# Patient Record
Sex: FEMALE | NOT HISPANIC OR LATINO | URBAN - METROPOLITAN AREA
[De-identification: names, ages, dates, MRNs, and addresses within clinical notes are randomized per-mention and may not be internally consistent; named-entity substitution may affect disease eponyms.]

---

## 2018-05-10 ENCOUNTER — INPATIENT (INPATIENT)
Facility: HOSPITAL | Age: 53
LOS: 3 days | Discharge: HOME | End: 2018-05-14
Attending: INTERNAL MEDICINE | Admitting: INTERNAL MEDICINE
Payer: COMMERCIAL

## 2018-05-10 VITALS
TEMPERATURE: 98 F | SYSTOLIC BLOOD PRESSURE: 167 MMHG | DIASTOLIC BLOOD PRESSURE: 102 MMHG | RESPIRATION RATE: 20 BRPM | OXYGEN SATURATION: 100 % | HEART RATE: 79 BPM

## 2018-05-10 LAB
ALBUMIN SERPL ELPH-MCNC: 4 G/DL — SIGNIFICANT CHANGE UP (ref 3.3–5)
ALP SERPL-CCNC: 72 U/L — SIGNIFICANT CHANGE UP (ref 40–120)
ALT FLD-CCNC: 14 U/L — SIGNIFICANT CHANGE UP (ref 4–33)
APPEARANCE UR: CLEAR — SIGNIFICANT CHANGE UP
APTT BLD: 30.2 SEC — SIGNIFICANT CHANGE UP (ref 27.5–37.4)
AST SERPL-CCNC: 19 U/L — SIGNIFICANT CHANGE UP (ref 4–32)
BASE EXCESS BLDV CALC-SCNC: 4.1 MMOL/L — SIGNIFICANT CHANGE UP
BASOPHILS # BLD AUTO: 0.07 K/UL — SIGNIFICANT CHANGE UP (ref 0–0.2)
BASOPHILS NFR BLD AUTO: 0.7 % — SIGNIFICANT CHANGE UP (ref 0–2)
BILIRUB SERPL-MCNC: 0.2 MG/DL — SIGNIFICANT CHANGE UP (ref 0.2–1.2)
BILIRUB UR-MCNC: NEGATIVE — SIGNIFICANT CHANGE UP
BLOOD GAS VENOUS - CREATININE: 0.52 MG/DL — SIGNIFICANT CHANGE UP (ref 0.5–1.3)
BLOOD UR QL VISUAL: HIGH
BUN SERPL-MCNC: 11 MG/DL — SIGNIFICANT CHANGE UP (ref 7–23)
CALCIUM SERPL-MCNC: 8.1 MG/DL — LOW (ref 8.4–10.5)
CHLORIDE BLDV-SCNC: 103 MMOL/L — SIGNIFICANT CHANGE UP (ref 96–108)
CHLORIDE SERPL-SCNC: 98 MMOL/L — SIGNIFICANT CHANGE UP (ref 98–107)
CO2 SERPL-SCNC: 27 MMOL/L — SIGNIFICANT CHANGE UP (ref 22–31)
COLOR SPEC: SIGNIFICANT CHANGE UP
CREAT SERPL-MCNC: 0.69 MG/DL — SIGNIFICANT CHANGE UP (ref 0.5–1.3)
EOSINOPHIL # BLD AUTO: 0.31 K/UL — SIGNIFICANT CHANGE UP (ref 0–0.5)
EOSINOPHIL NFR BLD AUTO: 3.3 % — SIGNIFICANT CHANGE UP (ref 0–6)
GAS PNL BLDV: 137 MMOL/L — SIGNIFICANT CHANGE UP (ref 136–146)
GLUCOSE BLDV-MCNC: 104 — HIGH (ref 70–99)
GLUCOSE SERPL-MCNC: 105 MG/DL — HIGH (ref 70–99)
GLUCOSE UR-MCNC: NEGATIVE — SIGNIFICANT CHANGE UP
HCO3 BLDV-SCNC: 26 MMOL/L — SIGNIFICANT CHANGE UP (ref 20–27)
HCT VFR BLD CALC: 36.7 % — SIGNIFICANT CHANGE UP (ref 34.5–45)
HCT VFR BLDV CALC: 36.7 % — SIGNIFICANT CHANGE UP (ref 34.5–45)
HGB BLD-MCNC: 11.7 G/DL — SIGNIFICANT CHANGE UP (ref 11.5–15.5)
HGB BLDV-MCNC: 11.9 G/DL — SIGNIFICANT CHANGE UP (ref 11.5–15.5)
IMM GRANULOCYTES # BLD AUTO: 0.03 # — SIGNIFICANT CHANGE UP
IMM GRANULOCYTES NFR BLD AUTO: 0.3 % — SIGNIFICANT CHANGE UP (ref 0–1.5)
INR BLD: 1.01 — SIGNIFICANT CHANGE UP (ref 0.88–1.17)
KETONES UR-MCNC: NEGATIVE — SIGNIFICANT CHANGE UP
LACTATE BLDV-MCNC: 1.1 MMOL/L — SIGNIFICANT CHANGE UP (ref 0.5–2)
LEUKOCYTE ESTERASE UR-ACNC: HIGH
LYMPHOCYTES # BLD AUTO: 1.45 K/UL — SIGNIFICANT CHANGE UP (ref 1–3.3)
LYMPHOCYTES # BLD AUTO: 15.3 % — SIGNIFICANT CHANGE UP (ref 13–44)
MCHC RBC-ENTMCNC: 28.1 PG — SIGNIFICANT CHANGE UP (ref 27–34)
MCHC RBC-ENTMCNC: 31.9 % — LOW (ref 32–36)
MCV RBC AUTO: 88.2 FL — SIGNIFICANT CHANGE UP (ref 80–100)
MONOCYTES # BLD AUTO: 0.94 K/UL — HIGH (ref 0–0.9)
MONOCYTES NFR BLD AUTO: 9.9 % — SIGNIFICANT CHANGE UP (ref 2–14)
MUCOUS THREADS # UR AUTO: SIGNIFICANT CHANGE UP
NEUTROPHILS # BLD AUTO: 6.67 K/UL — SIGNIFICANT CHANGE UP (ref 1.8–7.4)
NEUTROPHILS NFR BLD AUTO: 70.5 % — SIGNIFICANT CHANGE UP (ref 43–77)
NITRITE UR-MCNC: NEGATIVE — SIGNIFICANT CHANGE UP
NON-SQ EPI CELLS # UR AUTO: <1 — SIGNIFICANT CHANGE UP
NRBC # FLD: 0 — SIGNIFICANT CHANGE UP
NT-PROBNP SERPL-SCNC: 338.1 PG/ML — SIGNIFICANT CHANGE UP
PCO2 BLDV: 48 MMHG — SIGNIFICANT CHANGE UP (ref 41–51)
PH BLDV: 7.39 PH — SIGNIFICANT CHANGE UP (ref 7.32–7.43)
PH UR: 7 — SIGNIFICANT CHANGE UP (ref 4.6–8)
PLATELET # BLD AUTO: 379 K/UL — SIGNIFICANT CHANGE UP (ref 150–400)
PMV BLD: 9.7 FL — SIGNIFICANT CHANGE UP (ref 7–13)
PO2 BLDV: 25 MMHG — LOW (ref 35–40)
POTASSIUM BLDV-SCNC: 3.5 MMOL/L — SIGNIFICANT CHANGE UP (ref 3.4–4.5)
POTASSIUM SERPL-MCNC: 3.8 MMOL/L — SIGNIFICANT CHANGE UP (ref 3.5–5.3)
POTASSIUM SERPL-SCNC: 3.8 MMOL/L — SIGNIFICANT CHANGE UP (ref 3.5–5.3)
PROT SERPL-MCNC: 7.3 G/DL — SIGNIFICANT CHANGE UP (ref 6–8.3)
PROT UR-MCNC: NEGATIVE MG/DL — SIGNIFICANT CHANGE UP
PROTHROM AB SERPL-ACNC: 11.2 SEC — SIGNIFICANT CHANGE UP (ref 9.8–13.1)
RBC # BLD: 4.16 M/UL — SIGNIFICANT CHANGE UP (ref 3.8–5.2)
RBC # FLD: 15.1 % — HIGH (ref 10.3–14.5)
RBC CASTS # UR COMP ASSIST: SIGNIFICANT CHANGE UP (ref 0–?)
SAO2 % BLDV: 37.7 % — LOW (ref 60–85)
SODIUM SERPL-SCNC: 138 MMOL/L — SIGNIFICANT CHANGE UP (ref 135–145)
SP GR SPEC: 1.01 — SIGNIFICANT CHANGE UP (ref 1–1.04)
SQUAMOUS # UR AUTO: SIGNIFICANT CHANGE UP
TROPONIN T SERPL-MCNC: < 0.06 NG/ML — SIGNIFICANT CHANGE UP (ref 0–0.06)
UROBILINOGEN FLD QL: NORMAL MG/DL — SIGNIFICANT CHANGE UP
WBC # BLD: 9.47 K/UL — SIGNIFICANT CHANGE UP (ref 3.8–10.5)
WBC # FLD AUTO: 9.47 K/UL — SIGNIFICANT CHANGE UP (ref 3.8–10.5)
WBC UR QL: SIGNIFICANT CHANGE UP (ref 0–?)

## 2018-05-10 PROCEDURE — 74174 CTA ABD&PLVS W/CONTRAST: CPT | Mod: 26

## 2018-05-10 PROCEDURE — 70450 CT HEAD/BRAIN W/O DYE: CPT | Mod: 26

## 2018-05-10 PROCEDURE — 71275 CT ANGIOGRAPHY CHEST: CPT | Mod: 26

## 2018-05-10 PROCEDURE — 71045 X-RAY EXAM CHEST 1 VIEW: CPT | Mod: 26

## 2018-05-10 RX ORDER — ASPIRIN/CALCIUM CARB/MAGNESIUM 324 MG
324 TABLET ORAL ONCE
Qty: 0 | Refills: 0 | Status: COMPLETED | OUTPATIENT
Start: 2018-05-10 | End: 2018-05-10

## 2018-05-10 RX ORDER — FAMOTIDINE 10 MG/ML
20 INJECTION INTRAVENOUS ONCE
Qty: 0 | Refills: 0 | Status: COMPLETED | OUTPATIENT
Start: 2018-05-10 | End: 2018-05-10

## 2018-05-10 RX ADMIN — FAMOTIDINE 20 MILLIGRAM(S): 10 INJECTION INTRAVENOUS at 21:39

## 2018-05-10 RX ADMIN — Medication 324 MILLIGRAM(S): at 21:39

## 2018-05-10 NOTE — ED ADULT TRIAGE NOTE - CHIEF COMPLAINT QUOTE
Pt with multiple complaints co intermittent  chest pain radiating into upper back x 1month with swelling to lower legs which has now subsided . Pt was in [place] when her symptoms started. pt now reports still having chest pain. Pt also reports droop to right eye and mouth, pts mouth asymmetrical appears to look like bells  palsy   . pt also reports increased thirst x 1 day with frequent urination finger stick in triage 109.

## 2018-05-10 NOTE — ED ADULT NURSE NOTE - OBJECTIVE STATEMENT
pt a*o x3, Telugu speaking, son at bedside as preferred  c/o chest pain and right facial droop. pt states she has been having facial droop x 1 week and chest pain on and off x 1 month. states pain is worst at night and laying down, radiating towards the back. currently denies chest pain, numbness, tingling and headache. md assessed. right ac 20g placed. nad noted. will monitor

## 2018-05-10 NOTE — ED PROVIDER NOTE - OBJECTIVE STATEMENT
51 yo F w/ unclear pmhx, brought in by family members for intermittent chest and back pain x 1 month, and right side facial droop x 1 week. Chest pain is intermittent, worst at night, when lying flat, burning. Denies any nausea, vomiting, fever, chills, productive cough, runny nose, sore throat. Back pain is now radiating to the left arm. Patient has an asymmetric smile, unable to close right eyes. denies any numbness or tingling to the upper and lower extremities. Denies any hx of stroke. Pt just got back from a 2 month vacation in [facility], where she was seen by a cardiologist for chest pain. Pt does not have a cardiologist in the [place]. 53 yo F w/ unclear pmhx, brought in by family members for intermittent chest and back pain x 1 month, and right side facial droop x 1 week. Chest pain is intermittent, worst at night, when lying flat, burning. Denies any nausea, vomiting, fever, chills, productive cough, runny nose, sore throat. Back pain is now radiating to the left arm. Patient has an asymmetric smile, unable to close right eyes. denies any numbness or tingling to the upper and lower extremities. Denies any hx of stroke. Pt just got back from a 2 month vacation in [facility], where she was seen by a cardiologist for chest pain. Pt does not have a cardiologist in the [facility].

## 2018-05-10 NOTE — ED PROVIDER NOTE - ATTENDING CONTRIBUTION TO CARE
MD Marques:  I performed a face to face bedside interview with patient regarding history of present illness, review of symptoms and past medical history. I completed an independent physical exam(documented below).  I have discussed patient's plan of care with resident.   I agree with note as stated above, having amended the EMR as needed to reflect my findings. I have discussed the assessment and plan of care.  This includes during the time I functioned as the attending physician for this patient.  PE:  Gen: Alert, in mild distress  Head: NC, AT,  EOMI, normal lids/conjunctiva  ENT:  normal hearing, patent oropharynx without erythema/exudate, uvula midline  Neck: +supple, no tenderness/meningismus/JVD, +Trachea midline  Chest: no chest wall tenderness, equal chest rise  Pulm: Bilateral BS, normal resp effort, no wheeze/stridor/retractions  CV: RRR, no M/R/G, +dist pulses  Abd: soft, NT/ND, +BS, no hepatosplenomegaly  Rectal: deferred  Mskel: no edema/erythema/cyanosis  Skin: no rash  Neuro: AAOx3, +right sided facial droop; 5/5 symmetrical strength and sensation in all extremities  MDM:  53yo F, denies pmh, brought in by family for intermittent cp and back pain (unclear if rads from chest to back) X 1 month, worsened at night and when recumbent, began after recent flight to [employer]. Also reporting R facial droop X 1week. Facial droop most consistent w/ bell's palsy, but will get CT head. Labs and CTA chest/AP to r/o dissection/PE, and admission for ACS r/o.

## 2018-05-11 DIAGNOSIS — E78.5 HYPERLIPIDEMIA, UNSPECIFIED: ICD-10-CM

## 2018-05-11 DIAGNOSIS — F41.9 ANXIETY DISORDER, UNSPECIFIED: ICD-10-CM

## 2018-05-11 DIAGNOSIS — Z29.9 ENCOUNTER FOR PROPHYLACTIC MEASURES, UNSPECIFIED: ICD-10-CM

## 2018-05-11 DIAGNOSIS — M54.9 DORSALGIA, UNSPECIFIED: ICD-10-CM

## 2018-05-11 DIAGNOSIS — R07.9 CHEST PAIN, UNSPECIFIED: ICD-10-CM

## 2018-05-11 DIAGNOSIS — I25.10 ATHEROSCLEROTIC HEART DISEASE OF NATIVE CORONARY ARTERY WITHOUT ANGINA PECTORIS: ICD-10-CM

## 2018-05-11 DIAGNOSIS — E03.9 HYPOTHYROIDISM, UNSPECIFIED: ICD-10-CM

## 2018-05-11 DIAGNOSIS — G51.0 BELL'S PALSY: ICD-10-CM

## 2018-05-11 DIAGNOSIS — I10 ESSENTIAL (PRIMARY) HYPERTENSION: ICD-10-CM

## 2018-05-11 LAB
BUN SERPL-MCNC: 8 MG/DL — SIGNIFICANT CHANGE UP (ref 7–23)
CALCIUM SERPL-MCNC: 8.3 MG/DL — LOW (ref 8.4–10.5)
CHLORIDE SERPL-SCNC: 102 MMOL/L — SIGNIFICANT CHANGE UP (ref 98–107)
CHOLEST SERPL-MCNC: 127 MG/DL — SIGNIFICANT CHANGE UP (ref 120–199)
CK MB BLD-MCNC: 1 NG/ML — SIGNIFICANT CHANGE UP (ref 1–4.7)
CK MB BLD-MCNC: 1.22 NG/ML — SIGNIFICANT CHANGE UP (ref 1–4.7)
CK MB BLD-MCNC: SIGNIFICANT CHANGE UP (ref 0–2.5)
CK SERPL-CCNC: 45 U/L — SIGNIFICANT CHANGE UP (ref 25–170)
CK SERPL-CCNC: 57 U/L — SIGNIFICANT CHANGE UP (ref 25–170)
CO2 SERPL-SCNC: 26 MMOL/L — SIGNIFICANT CHANGE UP (ref 22–31)
CREAT SERPL-MCNC: 0.66 MG/DL — SIGNIFICANT CHANGE UP (ref 0.5–1.3)
GLUCOSE SERPL-MCNC: 94 MG/DL — SIGNIFICANT CHANGE UP (ref 70–99)
HBA1C BLD-MCNC: 6.3 % — HIGH (ref 4–5.6)
HCT VFR BLD CALC: 37.5 % — SIGNIFICANT CHANGE UP (ref 34.5–45)
HDLC SERPL-MCNC: 35 MG/DL — LOW (ref 45–65)
HGB BLD-MCNC: 12.1 G/DL — SIGNIFICANT CHANGE UP (ref 11.5–15.5)
LIPID PNL WITH DIRECT LDL SERPL: 83 MG/DL — SIGNIFICANT CHANGE UP
MAGNESIUM SERPL-MCNC: 2.2 MG/DL — SIGNIFICANT CHANGE UP (ref 1.6–2.6)
MCHC RBC-ENTMCNC: 27.9 PG — SIGNIFICANT CHANGE UP (ref 27–34)
MCHC RBC-ENTMCNC: 32.3 % — SIGNIFICANT CHANGE UP (ref 32–36)
MCV RBC AUTO: 86.4 FL — SIGNIFICANT CHANGE UP (ref 80–100)
NRBC # FLD: 0 — SIGNIFICANT CHANGE UP
PHOSPHATE SERPL-MCNC: 4.3 MG/DL — SIGNIFICANT CHANGE UP (ref 2.5–4.5)
PLATELET # BLD AUTO: 371 K/UL — SIGNIFICANT CHANGE UP (ref 150–400)
PMV BLD: 9.8 FL — SIGNIFICANT CHANGE UP (ref 7–13)
POTASSIUM SERPL-MCNC: 3.9 MMOL/L — SIGNIFICANT CHANGE UP (ref 3.5–5.3)
POTASSIUM SERPL-SCNC: 3.9 MMOL/L — SIGNIFICANT CHANGE UP (ref 3.5–5.3)
RBC # BLD: 4.34 M/UL — SIGNIFICANT CHANGE UP (ref 3.8–5.2)
RBC # FLD: 15.2 % — HIGH (ref 10.3–14.5)
SODIUM SERPL-SCNC: 140 MMOL/L — SIGNIFICANT CHANGE UP (ref 135–145)
TRIGL SERPL-MCNC: 107 MG/DL — SIGNIFICANT CHANGE UP (ref 10–149)
TROPONIN T SERPL-MCNC: < 0.06 NG/ML — SIGNIFICANT CHANGE UP (ref 0–0.06)
TSH SERPL-MCNC: 6.63 UIU/ML — HIGH (ref 0.27–4.2)
WBC # BLD: 8.66 K/UL — SIGNIFICANT CHANGE UP (ref 3.8–10.5)
WBC # FLD AUTO: 8.66 K/UL — SIGNIFICANT CHANGE UP (ref 3.8–10.5)

## 2018-05-11 PROCEDURE — 93010 ELECTROCARDIOGRAM REPORT: CPT

## 2018-05-11 PROCEDURE — 70551 MRI BRAIN STEM W/O DYE: CPT | Mod: 26

## 2018-05-11 RX ORDER — ENOXAPARIN SODIUM 100 MG/ML
40 INJECTION SUBCUTANEOUS EVERY 24 HOURS
Qty: 0 | Refills: 0 | Status: DISCONTINUED | OUTPATIENT
Start: 2018-05-11 | End: 2018-05-14

## 2018-05-11 RX ORDER — ASPIRIN/CALCIUM CARB/MAGNESIUM 324 MG
81 TABLET ORAL DAILY
Qty: 0 | Refills: 0 | Status: DISCONTINUED | OUTPATIENT
Start: 2018-05-11 | End: 2018-05-14

## 2018-05-11 RX ORDER — LEVOTHYROXINE SODIUM 125 MCG
50 TABLET ORAL DAILY
Qty: 0 | Refills: 0 | Status: DISCONTINUED | OUTPATIENT
Start: 2018-05-11 | End: 2018-05-14

## 2018-05-11 RX ORDER — CLONAZEPAM 1 MG
0.5 TABLET ORAL THREE TIMES A DAY
Qty: 0 | Refills: 0 | Status: DISCONTINUED | OUTPATIENT
Start: 2018-05-11 | End: 2018-05-14

## 2018-05-11 RX ORDER — LEVOTHYROXINE SODIUM 125 MCG
1 TABLET ORAL
Qty: 0 | Refills: 0 | COMMUNITY

## 2018-05-11 RX ORDER — CLONAZEPAM 1 MG
1 TABLET ORAL
Qty: 0 | Refills: 0 | COMMUNITY

## 2018-05-11 RX ORDER — ACETAMINOPHEN 500 MG
650 TABLET ORAL EVERY 6 HOURS
Qty: 0 | Refills: 0 | Status: DISCONTINUED | OUTPATIENT
Start: 2018-05-11 | End: 2018-05-14

## 2018-05-11 RX ORDER — ROSUVASTATIN CALCIUM 5 MG/1
1 TABLET ORAL
Qty: 0 | Refills: 0 | COMMUNITY

## 2018-05-11 RX ORDER — PANTOPRAZOLE SODIUM 20 MG/1
40 TABLET, DELAYED RELEASE ORAL
Qty: 0 | Refills: 0 | Status: DISCONTINUED | OUTPATIENT
Start: 2018-05-11 | End: 2018-05-14

## 2018-05-11 RX ORDER — ACETAMINOPHEN 500 MG
2 TABLET ORAL
Qty: 0 | Refills: 0 | COMMUNITY

## 2018-05-11 RX ORDER — ONDANSETRON 8 MG/1
4 TABLET, FILM COATED ORAL ONCE
Qty: 0 | Refills: 0 | Status: COMPLETED | OUTPATIENT
Start: 2018-05-11 | End: 2018-05-11

## 2018-05-11 RX ORDER — CLOPIDOGREL BISULFATE 75 MG/1
1 TABLET, FILM COATED ORAL
Qty: 0 | Refills: 0 | COMMUNITY

## 2018-05-11 RX ORDER — METOPROLOL TARTRATE 50 MG
1 TABLET ORAL
Qty: 0 | Refills: 0 | COMMUNITY

## 2018-05-11 RX ORDER — METOPROLOL TARTRATE 50 MG
50 TABLET ORAL
Qty: 0 | Refills: 0 | Status: DISCONTINUED | OUTPATIENT
Start: 2018-05-11 | End: 2018-05-14

## 2018-05-11 RX ORDER — OXYCODONE HYDROCHLORIDE 5 MG/1
20 TABLET ORAL EVERY 12 HOURS
Qty: 0 | Refills: 0 | Status: DISCONTINUED | OUTPATIENT
Start: 2018-05-11 | End: 2018-05-14

## 2018-05-11 RX ORDER — ATORVASTATIN CALCIUM 80 MG/1
40 TABLET, FILM COATED ORAL AT BEDTIME
Qty: 0 | Refills: 0 | Status: DISCONTINUED | OUTPATIENT
Start: 2018-05-11 | End: 2018-05-14

## 2018-05-11 RX ORDER — ASPIRIN/CALCIUM CARB/MAGNESIUM 324 MG
1 TABLET ORAL
Qty: 0 | Refills: 0 | COMMUNITY

## 2018-05-11 RX ORDER — VALACYCLOVIR 500 MG/1
1000 TABLET, FILM COATED ORAL THREE TIMES A DAY
Qty: 0 | Refills: 0 | Status: DISCONTINUED | OUTPATIENT
Start: 2018-05-11 | End: 2018-05-14

## 2018-05-11 RX ORDER — OXYCODONE HYDROCHLORIDE 5 MG/1
1 TABLET ORAL
Qty: 0 | Refills: 0 | COMMUNITY

## 2018-05-11 RX ORDER — CLOPIDOGREL BISULFATE 75 MG/1
75 TABLET, FILM COATED ORAL DAILY
Qty: 0 | Refills: 0 | Status: DISCONTINUED | OUTPATIENT
Start: 2018-05-11 | End: 2018-05-14

## 2018-05-11 RX ADMIN — Medication 1 DROP(S): at 23:53

## 2018-05-11 RX ADMIN — Medication 650 MILLIGRAM(S): at 09:04

## 2018-05-11 RX ADMIN — Medication 81 MILLIGRAM(S): at 14:46

## 2018-05-11 RX ADMIN — Medication 650 MILLIGRAM(S): at 09:35

## 2018-05-11 RX ADMIN — ONDANSETRON 4 MILLIGRAM(S): 8 TABLET, FILM COATED ORAL at 14:46

## 2018-05-11 RX ADMIN — OXYCODONE HYDROCHLORIDE 20 MILLIGRAM(S): 5 TABLET ORAL at 11:05

## 2018-05-11 RX ADMIN — CLOPIDOGREL BISULFATE 75 MILLIGRAM(S): 75 TABLET, FILM COATED ORAL at 14:46

## 2018-05-11 RX ADMIN — Medication 0.5 MILLIGRAM(S): at 15:45

## 2018-05-11 RX ADMIN — Medication 50 MICROGRAM(S): at 09:04

## 2018-05-11 RX ADMIN — ATORVASTATIN CALCIUM 40 MILLIGRAM(S): 80 TABLET, FILM COATED ORAL at 21:10

## 2018-05-11 RX ADMIN — ENOXAPARIN SODIUM 40 MILLIGRAM(S): 100 INJECTION SUBCUTANEOUS at 09:03

## 2018-05-11 RX ADMIN — ONDANSETRON 4 MILLIGRAM(S): 8 TABLET, FILM COATED ORAL at 20:15

## 2018-05-11 RX ADMIN — VALACYCLOVIR 1000 MILLIGRAM(S): 500 TABLET, FILM COATED ORAL at 23:53

## 2018-05-11 RX ADMIN — Medication 30 MILLILITER(S): at 21:10

## 2018-05-11 RX ADMIN — Medication 0.5 MILLIGRAM(S): at 21:10

## 2018-05-11 RX ADMIN — Medication 50 MILLIGRAM(S): at 18:09

## 2018-05-11 RX ADMIN — OXYCODONE HYDROCHLORIDE 20 MILLIGRAM(S): 5 TABLET ORAL at 10:24

## 2018-05-11 RX ADMIN — Medication 50 MILLIGRAM(S): at 09:05

## 2018-05-11 NOTE — H&P ADULT - ASSESSMENT
51 y/o female with a PMHx of presumed CAD (started on medical management in [facility]), HTN, HLD, hypothyroidism, chronic back pain and anxiety presents to ED with chest pain, back pain and right facial droop.

## 2018-05-11 NOTE — PROVIDER CONTACT NOTE (OTHER) - BACKGROUND
Pt admitted with chest pain, Aspirin was administered in the ER Pt admitted with chest pain, Aspirin was administered in the ER.

## 2018-05-11 NOTE — H&P ADULT - PROBLEM SELECTOR PLAN 2
Facial droop and inability to close right eye with no other neurological deficits likely consistent with Bell's Palsy  CT head negative  Consider neurology consult or MRI head to rule out CVA  Consider steroid or antiviral therapy

## 2018-05-11 NOTE — H&P ADULT - NEGATIVE GASTROINTESTINAL SYMPTOMS
no nausea/no change in bowel habits/no flatulence/no abdominal pain/no melena/no constipation/no hematochezia/no vomiting/no diarrhea

## 2018-05-11 NOTE — H&P ADULT - PROBLEM SELECTOR PLAN 3
Chronic back pain has been treated with Oxy and Tylenol  Continue Oxycodone and Tylenol  Will consider pain management consult if no relief of pain

## 2018-05-11 NOTE — H&P ADULT - PMH
Anxiety    CAD (coronary artery disease)  Medically managed  Chronic back pain    HLD (hyperlipidemia)    HTN (hypertension)    Hypothyroidism

## 2018-05-11 NOTE — CONSULT NOTE ADULT - CONSULT REASON
cp, back pain, right facial droop  PMHx of presumed CAD (started on medical management in [facility]), HTN, HLD, hypothyroidism, chronic back pain and anxiet

## 2018-05-11 NOTE — CONSULT NOTE ADULT - ATTENDING COMMENTS
Patient seen and examined with neurology resident and above note reviewed and I agree with assessment and plan as outlined. Patient with right peripheral facial palsy in setting of recent nonspecific viral infection. She otherwise has no other new deficits and MRI brain reviewed and shows only some nonspecific white matter changes but no acute ischemia.   COntinue antiviral, prednisone and eye lubrications, eye patch and artifical tears and supportive care and all questions answered with niece at bedside. .
Agree with above NP note.  cv stable  pt with facial droop and chronic chest pain  ecg with t wave abnl  plan for stress and echo for sx  neuro f/u for poss bells palsy

## 2018-05-11 NOTE — CONSULT NOTE ADULT - SUBJECTIVE AND OBJECTIVE BOX
CARDIOLOGY CONSULT - Dr. Rankin     CHIEF COMPLAINT:    HPI:  51 y/o Sinhala female, who arrived from [facility] two days ago, Pmed hx as mentioned below  presents to ED with chest pain, back pain and right facial droop. Pt reports that she has been having intermittent, non-pleuritic, non-exertional, left sided chest pain radiating to the left arm for the past month. Pt started to experience these symptoms last month in [facility] and was treated medically with ASA, Plavix, statin and nitroglycerin. Pt had an echocardiogram which revealed normal LV function, based on paperwork brought here. She did not have an angiogram or nuclear stress test. Since then, pt has been having chest pain intermittently with no palliating or provoking factors. Pt also has been experiencing a right facial droop, which started one week ago, which is associated with an inability to close her right eye. Pt has no other neurological deficits. Pt also admits to chronic back pain, which is causing a substantial amount of pain despite taking Oxycodone and Tylenol at home. Pt was recently treated for typhoid fever in [facility]. Pt denies fever, chills, headache, dizziness, visual deficits, shortness of breath, orthopnea, palpitations, abdominal pain, N/V/D/C, hematochezia, melena, dysuria, hematuria, LOC, syncope, peripheral edema, unilateral weakness, paresthesias, urinary or fecal incontinence.      PAST MEDICAL & SURGICAL HISTORY:  Chronic back pain  Hypothyroidism  Anxiety  CAD (coronary artery disease): Medically managed  HLD (hyperlipidemia)  HTN (hypertension)  No significant past surgical history          PREVIOUS DIAGNOSTIC TESTING:    [ ] Echocardiogram:    [ ]  Catheterization:    [ ] Stress Test:  	    MEDICATIONS:  MEDICATIONS  (STANDING):  aspirin enteric coated 81 milliGRAM(s) Oral daily  atorvastatin 40 milliGRAM(s) Oral at bedtime  clonazePAM Tablet 0.5 milliGRAM(s) Oral three times a day  clopidogrel Tablet 75 milliGRAM(s) Oral daily  enoxaparin Injectable 40 milliGRAM(s) SubCutaneous every 24 hours  levothyroxine 50 MICROGram(s) Oral daily  metoprolol tartrate 50 milliGRAM(s) Oral two times a day      FAMILY HISTORY:  No pertinent family history in first degree relatives      SOCIAL HISTORY:    [ ] Non-smoker  [ ] Smoker  [ ] Alcohol    Allergies    No Known Allergies    Intolerances    	    REVIEW OF SYSTEMS:  CONSTITUTIONAL: No fever, weight loss, or fatigue  EYES: No eye pain, visual disturbances, or discharge  ENMT:  No difficulty hearing, tinnitus, vertigo; No sinus or throat pain  NECK: No pain or stiffness  RESPIRATORY: No cough, wheezing, chills or hemoptysis; No Shortness of Breath  CARDIOVASCULAR: No chest pain, palpitations, passing out, dizziness, or leg swelling  GASTROINTESTINAL: No abdominal or epigastric pain. No nausea, vomiting, or hematemesis; No diarrhea or constipation. No melena or hematochezia.  GENITOURINARY: No dysuria, frequency, hematuria, or incontinence  NEUROLOGICAL: No headaches, memory loss, loss of strength, numbness, or tremors  SKIN: No itching, burning, rashes, or lesions   	    [ ] All others negative	  [ ] Unable to obtain    PHYSICAL EXAM:  T(C): 36.6 (05-11-18 @ 09:02), Max: 36.8 (05-11-18 @ 00:30)  HR: 77 (05-11-18 @ 09:02) (73 - 87)  BP: 149/91 (05-11-18 @ 09:02) (149/91 - 167/102)  RR: 17 (05-11-18 @ 09:02) (16 - 24)  SpO2: 99% (05-11-18 @ 09:02) (97% - 100%)  Wt(kg): --  I&O's Summary      Appearance: Normal	  Psychiatry: A & O x 3, Mood & affect appropriate  HEENT:   Normal oral mucosa, PERRL, EOMI	  Lymphatic: No lymphadenopathy  Cardiovascular: Normal S1 S2,RRR, No JVD, No murmurs  Respiratory: Lungs clear to auscultation	  Gastrointestinal:  Soft, Non-tender, + BS	  Skin: No rashes, No ecchymoses, No cyanosis	  Neurologic: Non-focal  Extremities: Normal range of motion, No clubbing, cyanosis or edema  Vascular: Peripheral pulses palpable 2+ bilaterally    TELEMETRY: 	    ECG:  	  RADIOLOGY:  < from: CT Head No Cont (05.10.18 @ 23:48) >    IMPRESSION:     No CT evidence of acute intracranial hemorrhage or mass effect.  No CT evidence of acute demarcated territorial infarct.           ------------------------------------------------------------------------------------------------      < from: Xray Chest 1 View- PORTABLE-Urgent (05.10.18 @ 21:20) >  FINDINGS:     The cardia mediastinal silhouette is not well evaluated in this   projection however it appears enlarged. There are no focal pulmonary   consolidations. There is no pneumothorax. There is no significant pleural   effusion.  Degenerative changes of the thoracic spine are present.    IMPRESSION:   Cardiomegaly with clear lungs.            < end of copied text >    OTHER: 	  < from: CT Angio Chest w/ IV Cont (05.10.18 @ 23:49) >  IMPRESSION:     No aortic dissection.    Extensive mediastinal and bilateral hilar lymphadenopathy.  Subcentimeter peripancreatic lymph node.              LABS:	 	    CARDIAC MARKERS:      CKMB: 1.00 ng/mL (05-11 @ 04:20)  CKMB: 1.22 ng/mL (05-10 @ 20:45)    CKMB Relative Index: Test not performed (05-10 @ 20:45)                            12.1   8.66  )-----------( 371      ( 11 May 2018 04:20 )             37.5     05-11    140  |  102  |  8   ----------------------------<  94  3.9   |  26  |  0.66    Ca    8.3<L>      11 May 2018 04:20  Phos  4.3     05-11  Mg     2.2     05-11    TPro  7.3  /  Alb  4.0  /  TBili  0.2  /  DBili  x   /  AST  19  /  ALT  14  /  AlkPhos  72  05-10    PT/INR - ( 10 May 2018 20:45 )   PT: 11.2 SEC;   INR: 1.01          PTT - ( 10 May 2018 20:45 )  PTT:30.2 SEC  proBNP: Serum Pro-Brain Natriuretic Peptide: 338.1 pg/mL (05-10 @ 20:45)    Lipid Profile:   HgA1c: Hemoglobin A1C, Whole Blood: 6.3 % (05-11 @ 04:20)    TSH: Thyroid Stimulating Hormone, Serum: 6.63 uIU/mL (05-11 @ 04:20) CARDIOLOGY CONSULT - Dr. Rankin     CHIEF COMPLAINT: cp / back pain/ facial droop     HPI:  53 y/o [facility] female, with  Pmed hx as mentioned below  presents to ED with chest pain, back pain and right facial droop. Pt reports that she has been having intermittent, non-pleuritic, non-exertional, left sided chest pain radiating to the left arm for the past month.  Patient and family reports symptoms of cp started after she had typhoid fever.   In [facility] she was treated medically with ASA, Plavix, statin and nitroglycerin. Pt had an echocardiogram which revealed normal LV function,, LVH, grade I diastolic dysfx, mild pericardial effusion. . She did not have an angiogram or nuclear stress test. Since then, pt has been having chest pain intermittently with no palliating or provoking factors. Pt also has been experiencing a right facial droop, which started one week ago, which is associated with an inability to close her right eye. Pt has no other neurological deficits. Pt also admits to chronic back pain, which is causing a substantial amount of pain despite taking Oxycodone and Tylenol at home. Pt denies fever, chills, headache, dizziness, visual deficits, shortness of breath, orthopnea, palpitations, abdominal pain, N/V/D/C, hematochezia, melena, dysuria, hematuria, LOC, syncope, peripheral edema, unilateral weakness, paresthesias, urinary or fecal incontinence.      PAST MEDICAL & SURGICAL HISTORY:  Chronic back pain  Hypothyroidism  Anxiety  CAD (coronary artery disease): Medically managed  HLD (hyperlipidemia)  HTN (hypertension)  No significant past surgical history          PREVIOUS DIAGNOSTIC TESTING:    [ ] Echocardiogram:    [ ]  Catheterization:    [ ] Stress Test:  	    MEDICATIONS:  MEDICATIONS  (STANDING):  aspirin enteric coated 81 milliGRAM(s) Oral daily  atorvastatin 40 milliGRAM(s) Oral at bedtime  clonazePAM Tablet 0.5 milliGRAM(s) Oral three times a day  clopidogrel Tablet 75 milliGRAM(s) Oral daily  enoxaparin Injectable 40 milliGRAM(s) SubCutaneous every 24 hours  levothyroxine 50 MICROGram(s) Oral daily  metoprolol tartrate 50 milliGRAM(s) Oral two times a day      FAMILY HISTORY:  No pertinent family history in first degree relatives      SOCIAL HISTORY:    [x ] Non-smoker  [ ] Smoker  [ ] Alcohol    Allergies    No Known Allergies    Intolerances    	    REVIEW OF SYSTEMS:  CONSTITUTIONAL: No fever, weight loss, or fatigue  EYES: No eye pain, visual disturbances, or discharge  ENMT:  No difficulty hearing, tinnitus, vertigo; No sinus or throat pain  NECK: No pain or stiffness  RESPIRATORY: No cough, wheezing, chills or hemoptysis; No Shortness of Breath  CARDIOVASCULAR: No palpitations, passing out, dizziness, or leg swelling + CP   GASTROINTESTINAL: No abdominal or epigastric pain. No nausea, vomiting, or hematemesis; No diarrhea or constipation. No melena or hematochezia.  GENITOURINARY: No dysuria, frequency, hematuria, or incontinence  NEUROLOGICAL: No headaches, memory loss, loss of strength, numbness, or tremors  SKIN: No itching, burning, rashes, or lesions   	    [x ] All others negative	  [ ] Unable to obtain    PHYSICAL EXAM:  T(C): 36.6 (05-11-18 @ 09:02), Max: 36.8 (05-11-18 @ 00:30)  HR: 77 (05-11-18 @ 09:02) (73 - 87)  BP: 149/91 (05-11-18 @ 09:02) (149/91 - 167/102)  RR: 17 (05-11-18 @ 09:02) (16 - 24)  SpO2: 99% (05-11-18 @ 09:02) (97% - 100%)  Wt(kg): --  I&O's Summary      Appearance: Normal	  Psychiatry: A & O x 3, Mood & affect appropriate  HEENT:   Normal oral mucosa, PERRL, EOMI	  Lymphatic: No lymphadenopathy  Cardiovascular: Normal S1 S2,RRR, No JVD, No murmurs  Respiratory: Lungs clear to auscultation	  Gastrointestinal:  Soft, Non-tender, + BS	  Skin: No rashes, No ecchymoses, No cyanosis	  Neurologic: Non-focal  Extremities: Normal range of motion, No clubbing, cyanosis or edema  Vascular: Peripheral pulses palpable 2+ bilaterally    TELEMETRY: NSR with TWI	    ECG:  NSR HR 61 with TWI on septal leads 	  RADIOLOGY:  < from: CT Head No Cont (05.10.18 @ 23:48) >    IMPRESSION:     No CT evidence of acute intracranial hemorrhage or mass effect.  No CT evidence of acute demarcated territorial infarct.           ------------------------------------------------------------------------------------------------      < from: Xray Chest 1 View- PORTABLE-Urgent (05.10.18 @ 21:20) >  FINDINGS:     The cardia mediastinal silhouette is not well evaluated in this   projection however it appears enlarged. There are no focal pulmonary   consolidations. There is no pneumothorax. There is no significant pleural   effusion.  Degenerative changes of the thoracic spine are present.    IMPRESSION:   Cardiomegaly with clear lungs.            < end of copied text >    OTHER: 	  < from: CT Angio Chest w/ IV Cont (05.10.18 @ 23:49) >  IMPRESSION:     No aortic dissection.    Extensive mediastinal and bilateral hilar lymphadenopathy.  Subcentimeter peripancreatic lymph node.              LABS:	 	    CARDIAC MARKERS:      CKMB: 1.00 ng/mL (05-11 @ 04:20)  CKMB: 1.22 ng/mL (05-10 @ 20:45)    CKMB Relative Index: Test not performed (05-10 @ 20:45)                            12.1   8.66  )-----------( 371      ( 11 May 2018 04:20 )             37.5     05-11    140  |  102  |  8   ----------------------------<  94  3.9   |  26  |  0.66    Ca    8.3<L>      11 May 2018 04:20  Phos  4.3     05-11  Mg     2.2     05-11    TPro  7.3  /  Alb  4.0  /  TBili  0.2  /  DBili  x   /  AST  19  /  ALT  14  /  AlkPhos  72  05-10    PT/INR - ( 10 May 2018 20:45 )   PT: 11.2 SEC;   INR: 1.01          PTT - ( 10 May 2018 20:45 )  PTT:30.2 SEC  proBNP: Serum Pro-Brain Natriuretic Peptide: 338.1 pg/mL (05-10 @ 20:45)    Lipid Profile:   HgA1c: Hemoglobin A1C, Whole Blood: 6.3 % (05-11 @ 04:20)    TSH: Thyroid Stimulating Hormone, Serum: 6.63 uIU/mL (05-11 @ 04:20)

## 2018-05-11 NOTE — H&P ADULT - NSHPLABSRESULTS_GEN_ALL_CORE
EKG: NSR at 76 bpm, TWI V1-V6, IRBBB, QT/QTc 452/508  CE x2: Negative  TSH: 6.63  HgA1C: 6.3  UA: Small LE    5/10 CXR: Cardiomegaly with clear lungs.  5/10 CT head: No CT evidence of acute intracranial hemorrhage or mass effect. No CT evidence of acute demarcated territorial infarct.  5/10 CT angio chest, abdomen, pelvis: No aortic dissection. Extensive mediastinal and bilateral hilar lymphadenopathy. Subcentimeter peripancreatic lymph node.

## 2018-05-11 NOTE — CONSULT NOTE ADULT - SUBJECTIVE AND OBJECTIVE BOX
NEUROLOGY CONSULT     Patient is a 52y old  Female who presents with a chief complaint of Chest pain (11 May 2018 07:45)      HPI:  51 y/o Yoruba female, who arrived from [facility] two days ago, with a PMHx of presumed CAD (started on medical management in [facility]), HTN, HLD, hypothyroidism, chronic back pain and anxiety presented w/ back pain and chest pain, which is being worked up by Cardiology. Neurology called for facial droop x1 week assoc w/ decreased eye blinking. History obtained by translation through family at bedside. One week ago patient woke up with right facial droop, decreased ability to close R eye. Has not been sleeping well due to back pain, but other than that denies weakness ine xtremities, sensory changes, difficulty w/ gait, ear aches, cognitive changes.       PAST MEDICAL & SURGICAL HISTORY:  Chronic back pain  Hypothyroidism  Anxiety  CAD (coronary artery disease): Medically managed  HLD (hyperlipidemia)  HTN (hypertension)  No significant past surgical history      Allergies    No Known Allergies    Intolerances        MEDICATIONS  (STANDING):  aspirin enteric coated 81 milliGRAM(s) Oral daily  atorvastatin 40 milliGRAM(s) Oral at bedtime  clonazePAM Tablet 0.5 milliGRAM(s) Oral three times a day  clopidogrel Tablet 75 milliGRAM(s) Oral daily  enoxaparin Injectable 40 milliGRAM(s) SubCutaneous every 24 hours  levothyroxine 50 MICROGram(s) Oral daily  metoprolol tartrate 50 milliGRAM(s) Oral two times a day    MEDICATIONS  (PRN):  acetaminophen   Tablet. 650 milliGRAM(s) Oral every 6 hours PRN Mild and moderate pain  oxyCODONE  ER Tablet 20 milliGRAM(s) Oral every 12 hours PRN Severe pain    Vital Signs Last 24 Hrs  T(C): 36.6 (11 May 2018 09:02), Max: 36.8 (11 May 2018 00:30)  T(F): 97.8 (11 May 2018 09:02), Max: 98.2 (11 May 2018 00:30)  HR: 77 (11 May 2018 09:02) (73 - 87)  BP: 149/91 (11 May 2018 09:02) (149/91 - 167/102)  BP(mean): --  RR: 17 (11 May 2018 09:02) (16 - 24)  SpO2: 99% (11 May 2018 09:02) (97% - 100%)    PHYSICAL EXAM:   General appearance: No acute distress                 Mental Status: AAOx3, fluent speech, follows simple commands, able to name  Cranial Nerves: EOMI, PERRL, VFF, V1-V3 intact, R moderate facial droop,   tongue midline. R VII nerve palsy -unable to close R eye, decreased blink R w/ forehead involvement   Motor: strength 5/5 throughout. No drift x4,   Sensation: Intact to LT throughout  Coordination: FTN intact b/l    LABS:                          12.1   8.66  )-----------( 371      ( 11 May 2018 04:20 )             37.5     05-11    140  |  102  |  8   ----------------------------<  94  3.9   |  26  |  0.66    Ca    8.3<L>      11 May 2018 04:20  Phos  4.3     05-11  Mg     2.2     05-11    TPro  7.3  /  Alb  4.0  /  TBili  0.2  /  DBili  x   /  AST  19  /  ALT  14  /  AlkPhos  72  05-10      IMAGING:

## 2018-05-11 NOTE — H&P ADULT - PROBLEM SELECTOR PLAN 1
Admit to telemetry, serial cardiac enzymes, serial EKGs  Check CBC, CMP, TSH, FLP, HgA1C  CT angio chest negative for dissection  Given pt with ongoing chest pain and never had invasive cardiac workup, will consider NST vs cath  Continue ASA, Plavix, Lipitor, Metoprolol  F/U MD note

## 2018-05-11 NOTE — H&P ADULT - NSHPLANGTRANSLATORFT_GEN_A_CORE
Pt is Vietnamese speaking but her daughter is requesting to translate.  services offered but refused.

## 2018-05-11 NOTE — CONSULT NOTE ADULT - ASSESSMENT
53 y/o F w/ CAD presenting for chest pain and back pain, Neuro called for R facial droop involving forehead and decreased eye closure on R. Etiology likely R VII nerve palsy/Bell's Palsy, no evidence of central VII nerve pathology. Although this has been going on for one week, and treatment should be started within three days of symptom onset, given moderate-severe droop (House-Brackmann III-IV stage), will still give treatment course.     Recommendations:   - eye patch and eye lubrication drops to prevent corneal irritation and abrasion   - Valacyclovir 1000mg TID PO x7 days   - Prednisone 60 mg PO x7 days, with quick taper   - FS TID while on steroids   - check Cr daily, if uptrending stop Valacyclovir   - PT to eval to give face movements recommendations on exercises to do at home to improve function   -attending note to follow
52 year old female with  presumed CAD (started on medical management in [facility]), HTN, HLD, hypothyroidism, chronic back pain and anxiety admitted with chest pain/ back pain/ right facial droop    1. Chest pain   unclear etiology  r.o pericarditis given recent hx of typhoid fever vs ischemic heart disease vs deferred pain from chronic back pain  no evidence of ACS or decomp CHF on exam   EKG revealing TWI on septal leads which are unchanged from prior ekg performed in [facility]   echo performed in [facility] revealing  normal LV function,, LVH, grade I diastolic dysfx, mild pericardial effusion  will check echo  r/o pericarditis/ re-eval progression of pericardial effusion   plan for pharm Stress test for ischemic work up   continue with asa/ plavix/ statin    chest xray revealing clear lungs / cardiomegaly   CTA negative for aoritc dissection     2. Right facial droop ? bells Palsy  Neuro f/u   ct head negative   plan for antivirals and steroids per neuro   pending MRI r.o CVA     3. htn   on BB   trend BP     dvt ppx

## 2018-05-11 NOTE — H&P ADULT - NEGATIVE CARDIOVASCULAR SYMPTOMS
no peripheral edema/no palpitations/no claudication/no orthopnea/no dyspnea on exertion/no paroxysmal nocturnal dyspnea

## 2018-05-11 NOTE — PROVIDER CONTACT NOTE (OTHER) - SITUATION
Pt complaining of five out of ten chest pain that comes and goes, radiating to her back along with BP of 153/104 when arriving to the floor from the ER. Pt complaining of five out of ten chest pain that comes and goes, radiating to her back, and abdominal pain two out of ten, along with BP of 153/104 when arriving to the floor from the ER.

## 2018-05-11 NOTE — H&P ADULT - NEGATIVE NEUROLOGICAL SYMPTOMS
no tremors/no loss of sensation/no difficulty walking/no transient paralysis/no paresthesias/no loss of consciousness/no hemiparesis/no generalized seizures/no focal seizures/no weakness/no vertigo/no headache/no confusion/no syncope

## 2018-05-11 NOTE — H&P ADULT - NEUROLOGICAL DETAILS
responds to verbal commands/cranial nerve palsy/alert and oriented x 3/sensation intact/responds to pain

## 2018-05-11 NOTE — PROVIDER CONTACT NOTE (OTHER) - ASSESSMENT
Pt VS stable; BP elevated. No SOB or diaphoresis Pt VS stable; BP elevated. No SOB or diaphoresis. NSR on tele.

## 2018-05-11 NOTE — H&P ADULT - HISTORY OF PRESENT ILLNESS
53 y/o Sami female, who arrived from [facility] two days ago, with a PMHx of presumed CAD (started on medical management in [facility]), HTN, HLD, hypothyroidism, chronic back pain and anxiety presents to ED with chest pain, back pain and right facial droop. Pt reports that she has been having intermittent, non-pleuritic, non-exertional, left sided chest pain radiating to the left arm for the past month. Pt started to experience these symptoms last month in [facility] and was treated medically with ASA, Plavix, statin and nitroglycerin. Pt had an echocardiogram which revealed normal LV function, based on paperwork brought here. She did not have an angiogram or nuclear stress test. Since then, pt has been having chest pain intermittently with no palliating or provoking factors. Pt also has been experiencing a right facial droop, which started one week ago, which is associated with an inability to close her right eye. Pt has no other neurological deficits. Pt also admits to chronic back pain, which is causing a substantial amount of pain despite taking Oxycodone and Tylenol at home. Pt was recently treated for typhoid fever in [facility]. Pt denies fever, chills, headache, dizziness, visual deficits, shortness of breath, orthopnea, palpitations, abdominal pain, N/V/D/C, hematochezia, melena, dysuria, hematuria, LOC, syncope, peripheral edema, unilateral weakness, paresthesias, urinary or fecal incontinence.

## 2018-05-12 LAB
BACTERIA UR CULT: SIGNIFICANT CHANGE UP
BUN SERPL-MCNC: 15 MG/DL — SIGNIFICANT CHANGE UP (ref 7–23)
CALCIUM SERPL-MCNC: 8.2 MG/DL — LOW (ref 8.4–10.5)
CHLORIDE SERPL-SCNC: 97 MMOL/L — LOW (ref 98–107)
CO2 SERPL-SCNC: 27 MMOL/L — SIGNIFICANT CHANGE UP (ref 22–31)
CREAT SERPL-MCNC: 0.75 MG/DL — SIGNIFICANT CHANGE UP (ref 0.5–1.3)
GLUCOSE BLDC GLUCOMTR-MCNC: 201 MG/DL — HIGH (ref 70–99)
GLUCOSE SERPL-MCNC: 87 MG/DL — SIGNIFICANT CHANGE UP (ref 70–99)
HCG SERPL-ACNC: < 5 MIU/ML — SIGNIFICANT CHANGE UP
HCT VFR BLD CALC: 37.4 % — SIGNIFICANT CHANGE UP (ref 34.5–45)
HGB BLD-MCNC: 11.6 G/DL — SIGNIFICANT CHANGE UP (ref 11.5–15.5)
MAGNESIUM SERPL-MCNC: 2.3 MG/DL — SIGNIFICANT CHANGE UP (ref 1.6–2.6)
MCHC RBC-ENTMCNC: 27.1 PG — SIGNIFICANT CHANGE UP (ref 27–34)
MCHC RBC-ENTMCNC: 31 % — LOW (ref 32–36)
MCV RBC AUTO: 87.4 FL — SIGNIFICANT CHANGE UP (ref 80–100)
NRBC # FLD: 0 — SIGNIFICANT CHANGE UP
PLATELET # BLD AUTO: 381 K/UL — SIGNIFICANT CHANGE UP (ref 150–400)
PMV BLD: 10.1 FL — SIGNIFICANT CHANGE UP (ref 7–13)
POTASSIUM SERPL-MCNC: 3.7 MMOL/L — SIGNIFICANT CHANGE UP (ref 3.5–5.3)
POTASSIUM SERPL-SCNC: 3.7 MMOL/L — SIGNIFICANT CHANGE UP (ref 3.5–5.3)
RBC # BLD: 4.28 M/UL — SIGNIFICANT CHANGE UP (ref 3.8–5.2)
RBC # FLD: 15.4 % — HIGH (ref 10.3–14.5)
SODIUM SERPL-SCNC: 135 MMOL/L — SIGNIFICANT CHANGE UP (ref 135–145)
SPECIMEN SOURCE: SIGNIFICANT CHANGE UP
WBC # BLD: 7.11 K/UL — SIGNIFICANT CHANGE UP (ref 3.8–10.5)
WBC # FLD AUTO: 7.11 K/UL — SIGNIFICANT CHANGE UP (ref 3.8–10.5)

## 2018-05-12 PROCEDURE — 99222 1ST HOSP IP/OBS MODERATE 55: CPT | Mod: GC

## 2018-05-12 RX ORDER — SENNA PLUS 8.6 MG/1
2 TABLET ORAL AT BEDTIME
Qty: 0 | Refills: 0 | Status: DISCONTINUED | OUTPATIENT
Start: 2018-05-12 | End: 2018-05-14

## 2018-05-12 RX ORDER — DOCUSATE SODIUM 100 MG
100 CAPSULE ORAL
Qty: 0 | Refills: 0 | Status: DISCONTINUED | OUTPATIENT
Start: 2018-05-12 | End: 2018-05-14

## 2018-05-12 RX ADMIN — Medication 50 MILLIGRAM(S): at 06:31

## 2018-05-12 RX ADMIN — SENNA PLUS 2 TABLET(S): 8.6 TABLET ORAL at 23:24

## 2018-05-12 RX ADMIN — Medication 0.5 MILLIGRAM(S): at 06:30

## 2018-05-12 RX ADMIN — Medication 50 MICROGRAM(S): at 06:31

## 2018-05-12 RX ADMIN — Medication 50 MILLIGRAM(S): at 17:04

## 2018-05-12 RX ADMIN — PANTOPRAZOLE SODIUM 40 MILLIGRAM(S): 20 TABLET, DELAYED RELEASE ORAL at 06:31

## 2018-05-12 RX ADMIN — ATORVASTATIN CALCIUM 40 MILLIGRAM(S): 80 TABLET, FILM COATED ORAL at 23:25

## 2018-05-12 RX ADMIN — Medication 1 DROP(S): at 17:03

## 2018-05-12 RX ADMIN — CLOPIDOGREL BISULFATE 75 MILLIGRAM(S): 75 TABLET, FILM COATED ORAL at 12:51

## 2018-05-12 RX ADMIN — Medication 1 DROP(S): at 23:26

## 2018-05-12 RX ADMIN — VALACYCLOVIR 1000 MILLIGRAM(S): 500 TABLET, FILM COATED ORAL at 14:20

## 2018-05-12 RX ADMIN — Medication 1 DROP(S): at 06:30

## 2018-05-12 RX ADMIN — VALACYCLOVIR 1000 MILLIGRAM(S): 500 TABLET, FILM COATED ORAL at 23:25

## 2018-05-12 RX ADMIN — Medication 100 MILLIGRAM(S): at 23:26

## 2018-05-12 RX ADMIN — Medication 60 MILLIGRAM(S): at 06:31

## 2018-05-12 RX ADMIN — VALACYCLOVIR 1000 MILLIGRAM(S): 500 TABLET, FILM COATED ORAL at 06:31

## 2018-05-12 RX ADMIN — Medication 1 DROP(S): at 12:51

## 2018-05-12 RX ADMIN — Medication 0.5 MILLIGRAM(S): at 23:25

## 2018-05-12 RX ADMIN — Medication 30 MILLILITER(S): at 06:30

## 2018-05-12 RX ADMIN — ENOXAPARIN SODIUM 40 MILLIGRAM(S): 100 INJECTION SUBCUTANEOUS at 09:46

## 2018-05-12 RX ADMIN — Medication 0.5 MILLIGRAM(S): at 14:20

## 2018-05-12 RX ADMIN — Medication 30 MILLILITER(S): at 23:26

## 2018-05-12 RX ADMIN — Medication 81 MILLIGRAM(S): at 12:51

## 2018-05-12 NOTE — PROGRESS NOTE ADULT - SUBJECTIVE AND OBJECTIVE BOX
Patient is a 52y old  Female who presents with a chief complaint of Chest pain (11 May 2018 07:45)      SUBJECTIVE / OVERNIGHT EVENTS:   Feels better.  Denies CP/SOB/Palpitation/HA.    MEDICATIONS  (STANDING):  artificial tears (preservative free) Ophthalmic Solution 1 Drop(s) Right EYE four times a day  aspirin enteric coated 81 milliGRAM(s) Oral daily  atorvastatin 40 milliGRAM(s) Oral at bedtime  clonazePAM Tablet 0.5 milliGRAM(s) Oral three times a day  clopidogrel Tablet 75 milliGRAM(s) Oral daily  docusate sodium 100 milliGRAM(s) Oral two times a day  enoxaparin Injectable 40 milliGRAM(s) SubCutaneous every 24 hours  levothyroxine 50 MICROGram(s) Oral daily  metoprolol tartrate 50 milliGRAM(s) Oral two times a day  pantoprazole    Tablet 40 milliGRAM(s) Oral before breakfast  predniSONE   Tablet 60 milliGRAM(s) Oral daily  senna 2 Tablet(s) Oral at bedtime  valACYclovir 1000 milliGRAM(s) Oral three times a day    MEDICATIONS  (PRN):  acetaminophen   Tablet. 650 milliGRAM(s) Oral every 6 hours PRN Mild and moderate pain  aluminum hydroxide/magnesium hydroxide/simethicone Suspension 30 milliLiter(s) Oral every 4 hours PRN Dyspepsia  oxyCODONE  ER Tablet 20 milliGRAM(s) Oral every 12 hours PRN Severe pain        CAPILLARY BLOOD GLUCOSE      POCT Blood Glucose.: 201 mg/dL (12 May 2018 19:07)    I&O's Summary    11 May 2018 07:01  -  12 May 2018 07:00  --------------------------------------------------------  IN: 220 mL / OUT: 0 mL / NET: 220 mL        PHYSICAL EXAM:  GENERAL: NAD, well-developed  HEAD:  Atraumatic, Normocephalic  NECK: Supple, No JVD  CHEST/LUNG: Clear to auscultation bilaterally; No wheezing.  HEART: Regular rate and rhythm; No murmurs, rubs, or gallops  ABDOMEN: Soft, Nontender, Nondistended; Bowel sounds present  EXTREMITIES:   No clubbing, cyanosis, or edema  NEUROLOGY: AAO X 3  SKIN: No rashes    LABS:                        11.6   7.11  )-----------( 381      ( 12 May 2018 06:20 )             37.4     05-12    135  |  97<L>  |  15  ----------------------------<  87  3.7   |  27  |  0.75    Ca    8.2<L>      12 May 2018 06:20  Phos  4.3       Mg     2.3         TPro  7.3  /  Alb  4.0  /  TBili  0.2  /  DBili  x   /  AST  19  /  ALT  14  /  AlkPhos  72  05-10    PT/INR - ( 10 May 2018 20:45 )   PT: 11.2 SEC;   INR: 1.01          PTT - ( 10 May 2018 20:45 )  PTT:30.2 SEC  CARDIAC MARKERS ( 11 May 2018 04:20 )  x     / < 0.06 ng/mL / 45 u/L / 1.00 ng/mL / x      CARDIAC MARKERS ( 10 May 2018 20:45 )  x     / < 0.06 ng/mL / 57 u/L / 1.22 ng/mL / x          Urinalysis Basic - ( 10 May 2018 21:38 )    Color: COLORL / Appearance: CLEAR / S.007 / pH: 7.0  Gluc: NEGATIVE / Ketone: NEGATIVE  / Bili: NEGATIVE / Urobili: NORMAL mg/dL   Blood: SMALL / Protein: NEGATIVE mg/dL / Nitrite: NEGATIVE   Leuk Esterase: SMALL / RBC: 0-2 / WBC 2-5   Sq Epi: OCC / Non Sq Epi: x / Bacteria: x      CAPILLARY BLOOD GLUCOSE      POCT Blood Glucose.: 201 mg/dL (12 May 2018 19:07)    05-10 @ 22:04  Culture-urine   NO GROWTH AT 24 HOURS  Culture results --  method type --  Organism --  Organism Identification --  Specimen source URINE MIDSTREAM           05-10 @ 22:04  Culture blood --  Culture results --  Gram stain --  Gram stain blood --  Method type --  Organism --  Organism identification --  Specimen source URINE MIDSTREAM      RADIOLOGY & ADDITIONAL TESTS:    Imaging Personally Reviewed:    Consultant(s) Notes Reviewed:      Care Discussed with Consultants/Other Providers:

## 2018-05-12 NOTE — PROGRESS NOTE ADULT - ASSESSMENT
· Assessment	  53 y/o female with a PMHx of presumed CAD (started on medical management in [facility]), HTN, HLD, hypothyroidism, chronic back pain and anxiety presents to ED with chest pain, back pain and right facial droop.     Problem/Plan - 1:  ·  Problem: Chest pain.  Plan:  telemetry.  CT angio chest negative for dissection  NST tomorrow.  Continue ASA, Plavix, Lipitor, Metoprolol       Problem/Plan - 2:  ·  Problem: Bell's palsy.  Plan: Facial droop and inability to close right eye with no other neurological deficits likely consistent with Bell's Palsy  CT head negative   MRI head : ruled out CVA       Problem/Plan - 3:  ·  Problem: Chronic back pain.  Plan: Chronic back pain has been treated with Oxy and Tylenol  Continue Oxycodone and Tylenol       Problem/Plan - 4:  ·  Problem: CAD (coronary artery disease).  Plan:   Continue ASA, Plavix, Lipitor, Metoprolol       Problem/Plan - 5:  ·  Problem: HTN (hypertension).  Plan: Continue Metoprolol  Sodium restriction.      Problem/Plan - 6:  Problem: HLD (hyperlipidemia). Plan: Continue Lipitor  DASH diet.     Problem/Plan - 7:  ·  Problem: Hypothyroidism.  Plan: Continue Levothyroxine.      Problem/Plan - 8:  ·  Problem: Anxiety.  Plan: Continue Clonazepam.

## 2018-05-12 NOTE — PROGRESS NOTE ADULT - ASSESSMENT
52 year old female with  presumed CAD (started on medical management in [facility]), HTN, HLD, hypothyroidism, chronic back pain and anxiety admitted with chest pain/ back pain/ right facial droop    1. Chest pain   unclear etiology  r.o pericarditis given recent hx of typhoid fever vs ischemic heart disease vs deferred pain from chronic back pain  no evidence of ACS or decomp CHF on exam   EKG revealing TWI on septal leads which are unchanged from prior ekg performed in [facility]   echo performed in [facility] revealing  normal LV function,, LVH, grade I diastolic dysfx, mild pericardial effusion  will check echo  r/o pericarditis/ re-eval progression of pericardial effusion   plan for pharm Stress test for ischemic work up   continue with asa/ plavix/ statin    chest xray revealing clear lungs / cardiomegaly   CTA negative for aoritc dissection     2. Right facial droop ? bells Palsy  Neuro f/u   ct head negative   plan for antivirals and steroids per neuro   pending MRI r.o CVA     3. htn   on BB   trend BP     dvt ppx

## 2018-05-12 NOTE — CHART NOTE - NSCHARTNOTEFT_GEN_A_CORE
Pt had N/V yesterday day and evening.  I gave her Zofran, Protonix and Maalox.  Today she complains of much less chest and back discomfot and she ate breakfast this morning without feeling nauseous.      Plan:   Continue Zofran PRN  Continue Maalox PRN but pt encouraged to take it Q4hrs for today  Continue Protonix

## 2018-05-12 NOTE — PROGRESS NOTE ADULT - SUBJECTIVE AND OBJECTIVE BOX
CC: no acute events    TELEMETRY:     PHYSICAL EXAM:    T(C): 36.8 (05-12-18 @ 06:27), Max: 36.8 (05-12-18 @ 06:27)  HR: 69 (05-12-18 @ 06:27) (60 - 77)  BP: 123/60 (05-12-18 @ 06:27) (114/70 - 149/91)  RR: 16 (05-12-18 @ 06:27) (16 - 18)  SpO2: 99% (05-12-18 @ 06:27) (97% - 100%)  Wt(kg): --  I&O's Summary    11 May 2018 07:01  -  12 May 2018 07:00  --------------------------------------------------------  IN: 220 mL / OUT: 0 mL / NET: 220 mL        Appearance: Normal	  Cardiovascular: Normal S1 S2,RRR, No JVD, No murmurs  Respiratory: Lungs clear to auscultation	  Gastrointestinal:  Soft, Non-tender, + BS	  Extremities: Normal range of motion, No clubbing, cyanosis or edema  Vascular: Peripheral pulses palpable 2+ bilaterally     LABS:	 	                          11.6   7.11  )-----------( 381      ( 12 May 2018 06:20 )             37.4     05-12    135  |  97<L>  |  15  ----------------------------<  87  3.7   |  27  |  0.75    Ca    8.2<L>      12 May 2018 06:20  Phos  4.3     05-11  Mg     2.3     05-12    TPro  7.3  /  Alb  4.0  /  TBili  0.2  /  DBili  x   /  AST  19  /  ALT  14  /  AlkPhos  72  05-10      PT/INR - ( 10 May 2018 20:45 )   PT: 11.2 SEC;   INR: 1.01          PTT - ( 10 May 2018 20:45 )  PTT:30.2 SEC    CARDIAC MARKERS:

## 2018-05-13 LAB
BUN SERPL-MCNC: 14 MG/DL — SIGNIFICANT CHANGE UP (ref 7–23)
CALCIUM SERPL-MCNC: 8.5 MG/DL — SIGNIFICANT CHANGE UP (ref 8.4–10.5)
CHLORIDE SERPL-SCNC: 98 MMOL/L — SIGNIFICANT CHANGE UP (ref 98–107)
CO2 SERPL-SCNC: 26 MMOL/L — SIGNIFICANT CHANGE UP (ref 22–31)
CREAT SERPL-MCNC: 0.67 MG/DL — SIGNIFICANT CHANGE UP (ref 0.5–1.3)
GLUCOSE BLDC GLUCOMTR-MCNC: 108 MG/DL — HIGH (ref 70–99)
GLUCOSE BLDC GLUCOMTR-MCNC: 125 MG/DL — HIGH (ref 70–99)
GLUCOSE SERPL-MCNC: 111 MG/DL — HIGH (ref 70–99)
HCT VFR BLD CALC: 37 % — SIGNIFICANT CHANGE UP (ref 34.5–45)
HGB BLD-MCNC: 11.5 G/DL — SIGNIFICANT CHANGE UP (ref 11.5–15.5)
MAGNESIUM SERPL-MCNC: 2.4 MG/DL — SIGNIFICANT CHANGE UP (ref 1.6–2.6)
MCHC RBC-ENTMCNC: 27.4 PG — SIGNIFICANT CHANGE UP (ref 27–34)
MCHC RBC-ENTMCNC: 31.1 % — LOW (ref 32–36)
MCV RBC AUTO: 88.1 FL — SIGNIFICANT CHANGE UP (ref 80–100)
NRBC # FLD: 0 — SIGNIFICANT CHANGE UP
PLATELET # BLD AUTO: 375 K/UL — SIGNIFICANT CHANGE UP (ref 150–400)
PMV BLD: 10.3 FL — SIGNIFICANT CHANGE UP (ref 7–13)
POTASSIUM SERPL-MCNC: 4 MMOL/L — SIGNIFICANT CHANGE UP (ref 3.5–5.3)
POTASSIUM SERPL-SCNC: 4 MMOL/L — SIGNIFICANT CHANGE UP (ref 3.5–5.3)
RBC # BLD: 4.2 M/UL — SIGNIFICANT CHANGE UP (ref 3.8–5.2)
RBC # FLD: 15.3 % — HIGH (ref 10.3–14.5)
SODIUM SERPL-SCNC: 138 MMOL/L — SIGNIFICANT CHANGE UP (ref 135–145)
WBC # BLD: 9.34 K/UL — SIGNIFICANT CHANGE UP (ref 3.8–10.5)
WBC # FLD AUTO: 9.34 K/UL — SIGNIFICANT CHANGE UP (ref 3.8–10.5)

## 2018-05-13 PROCEDURE — 93016 CV STRESS TEST SUPVJ ONLY: CPT | Mod: GC

## 2018-05-13 PROCEDURE — 93018 CV STRESS TEST I&R ONLY: CPT | Mod: GC

## 2018-05-13 PROCEDURE — 78452 HT MUSCLE IMAGE SPECT MULT: CPT | Mod: 26

## 2018-05-13 RX ADMIN — Medication 50 MICROGRAM(S): at 06:13

## 2018-05-13 RX ADMIN — SENNA PLUS 2 TABLET(S): 8.6 TABLET ORAL at 23:21

## 2018-05-13 RX ADMIN — CLOPIDOGREL BISULFATE 75 MILLIGRAM(S): 75 TABLET, FILM COATED ORAL at 13:18

## 2018-05-13 RX ADMIN — Medication 1 DROP(S): at 23:21

## 2018-05-13 RX ADMIN — Medication 50 MILLIGRAM(S): at 06:13

## 2018-05-13 RX ADMIN — Medication 1 DROP(S): at 13:18

## 2018-05-13 RX ADMIN — Medication 50 MILLIGRAM(S): at 17:46

## 2018-05-13 RX ADMIN — VALACYCLOVIR 1000 MILLIGRAM(S): 500 TABLET, FILM COATED ORAL at 06:14

## 2018-05-13 RX ADMIN — Medication 1 DROP(S): at 17:46

## 2018-05-13 RX ADMIN — Medication 100 MILLIGRAM(S): at 06:12

## 2018-05-13 RX ADMIN — Medication 0.5 MILLIGRAM(S): at 23:21

## 2018-05-13 RX ADMIN — Medication 81 MILLIGRAM(S): at 13:18

## 2018-05-13 RX ADMIN — ATORVASTATIN CALCIUM 40 MILLIGRAM(S): 80 TABLET, FILM COATED ORAL at 23:21

## 2018-05-13 RX ADMIN — Medication 100 MILLIGRAM(S): at 17:46

## 2018-05-13 RX ADMIN — Medication 650 MILLIGRAM(S): at 06:52

## 2018-05-13 RX ADMIN — VALACYCLOVIR 1000 MILLIGRAM(S): 500 TABLET, FILM COATED ORAL at 23:21

## 2018-05-13 RX ADMIN — PANTOPRAZOLE SODIUM 40 MILLIGRAM(S): 20 TABLET, DELAYED RELEASE ORAL at 06:13

## 2018-05-13 RX ADMIN — Medication 0.5 MILLIGRAM(S): at 06:13

## 2018-05-13 RX ADMIN — ENOXAPARIN SODIUM 40 MILLIGRAM(S): 100 INJECTION SUBCUTANEOUS at 13:19

## 2018-05-13 RX ADMIN — Medication 1 DROP(S): at 06:12

## 2018-05-13 RX ADMIN — Medication 0.5 MILLIGRAM(S): at 13:17

## 2018-05-13 RX ADMIN — Medication 60 MILLIGRAM(S): at 06:13

## 2018-05-13 RX ADMIN — VALACYCLOVIR 1000 MILLIGRAM(S): 500 TABLET, FILM COATED ORAL at 13:18

## 2018-05-13 RX ADMIN — Medication 650 MILLIGRAM(S): at 06:22

## 2018-05-13 NOTE — PROGRESS NOTE ADULT - ASSESSMENT
· Assessment	  53 y/o female with a PMHx of presumed CAD (started on medical management in [facility]), HTN, HLD, hypothyroidism, chronic back pain and anxiety presents to ED with chest pain, back pain and right facial droop.     Problem/Plan - 1:  ·  Problem: Chest pain.  Plan:  telemetry.  CT angio chest negative for dissection  NST: No ischemia  Continue ASA, Plavix, Lipitor, Metoprolol  ECHO pending       Problem/Plan - 2:  ·  Problem: Bell's palsy.  Plan: Facial droop and inability to close right eye with no other neurological deficits likely consistent with Bell's Palsy  CT head negative   MRI head : ruled out CVA       Problem/Plan - 3:  ·  Problem: Chronic back pain.  Plan: Chronic back pain has been treated with Oxy and Tylenol  Continue Oxycodone and Tylenol       Problem/Plan - 4:  ·  Problem: CAD (coronary artery disease).  Plan:   Continue ASA, Plavix, Lipitor, Metoprolol       Problem/Plan - 5:  ·  Problem: HTN (hypertension).  Plan: Continue Metoprolol  Sodium restriction.      Problem/Plan - 6:  Problem: HLD (hyperlipidemia). Plan: Continue Lipitor  DASH diet.     Problem/Plan - 7:  ·  Problem: Hypothyroidism.  Plan: Continue Levothyroxine.      Problem/Plan - 8:  ·  Problem: Anxiety.  Plan: Continue Clonazepam.     Dw pt's daughter.

## 2018-05-13 NOTE — PROGRESS NOTE ADULT - ASSESSMENT
52 year old female with  presumed CAD (started on medical management in [facility]), HTN, HLD, hypothyroidism, chronic back pain and anxiety admitted with chest pain/ back pain/ right facial droop    1. Chest pain   unclear etiology  r.o pericarditis given recent hx of typhoid fever vs ischemic heart disease vs deferred pain from chronic back pain  no evidence of ACS or decomp CHF on exam   EKG revealing TWI on septal leads which are unchanged from prior ekg performed in [facility]   echo performed in [facility] revealing normal LV function,, LVH, grade I diastolic dysfx, mild pericardial effusion  pending echo results r/o pericarditis/ re-eval progression of pericardial effusion   pending stress results   continue with asa/ plavix/ statin    chest xray revealing clear lungs / cardiomegaly   CTA negative for aoritc dissection     2. Right facial droop ? bells Palsy  Neuro f/u   ct head negative   plan for antivirals and steroids per neuro   MRI neg to CVA     3. htn   on BB   bp stable     dvt ppx

## 2018-05-13 NOTE — PROGRESS NOTE ADULT - SUBJECTIVE AND OBJECTIVE BOX
CARDIOLOGY FOLLOW UP - Dr. Rankin    CC no cp/sob       PHYSICAL EXAM:  T(C): 37.2 (05-13-18 @ 04:45), Max: 37.2 (05-13-18 @ 04:45)  HR: 69 (05-13-18 @ 04:45) (60 - 71)  BP: 135/76 (05-13-18 @ 04:45) (114/79 - 135/76)  RR: 16 (05-13-18 @ 04:45) (16 - 17)  SpO2: 97% (05-13-18 @ 04:45) (96% - 99%)  Wt(kg): --  I&O's Summary    12 May 2018 07:01  -  13 May 2018 07:00  --------------------------------------------------------  IN: 1010 mL / OUT: 0 mL / NET: 1010 mL        Appearance: Normal	  Cardiovascular: Normal S1 S2,RRR, No JVD, No murmurs  Respiratory: Lungs clear to auscultation	  Gastrointestinal:  Soft, Non-tender, + BS	  Extremities: Normal range of motion, No clubbing, cyanosis or edema        MEDICATIONS  (STANDING):  artificial tears (preservative free) Ophthalmic Solution 1 Drop(s) Right EYE four times a day  aspirin enteric coated 81 milliGRAM(s) Oral daily  atorvastatin 40 milliGRAM(s) Oral at bedtime  clonazePAM Tablet 0.5 milliGRAM(s) Oral three times a day  clopidogrel Tablet 75 milliGRAM(s) Oral daily  docusate sodium 100 milliGRAM(s) Oral two times a day  enoxaparin Injectable 40 milliGRAM(s) SubCutaneous every 24 hours  levothyroxine 50 MICROGram(s) Oral daily  metoprolol tartrate 50 milliGRAM(s) Oral two times a day  pantoprazole    Tablet 40 milliGRAM(s) Oral before breakfast  predniSONE   Tablet 60 milliGRAM(s) Oral daily  senna 2 Tablet(s) Oral at bedtime  valACYclovir 1000 milliGRAM(s) Oral three times a day      TELEMETRY: 	    ECG:  	  RADIOLOGY:   DIAGNOSTIC TESTING:  [ ] Echocardiogram:  [ ]  Catheterization:  [ ] Stress Test:    OTHER: 	    LABS:	 	                                11.5   9.34  )-----------( 375      ( 13 May 2018 05:26 )             37.0     05-13    138  |  98  |  14  ----------------------------<  111<H>  4.0   |  26  |  0.67    Ca    8.5      13 May 2018 05:26  Mg     2.4     05-13

## 2018-05-13 NOTE — PROGRESS NOTE ADULT - SUBJECTIVE AND OBJECTIVE BOX
Patient is a 52y old  Female who presents with a chief complaint of Chest pain (11 May 2018 07:45)      SUBJECTIVE / OVERNIGHT EVENTS:   Feels better.  Denies CP/SOB/Palpitation/HA.    MEDICATIONS  (STANDING):  artificial tears (preservative free) Ophthalmic Solution 1 Drop(s) Right EYE four times a day  aspirin enteric coated 81 milliGRAM(s) Oral daily  atorvastatin 40 milliGRAM(s) Oral at bedtime  clonazePAM Tablet 0.5 milliGRAM(s) Oral three times a day  clopidogrel Tablet 75 milliGRAM(s) Oral daily  docusate sodium 100 milliGRAM(s) Oral two times a day  enoxaparin Injectable 40 milliGRAM(s) SubCutaneous every 24 hours  levothyroxine 50 MICROGram(s) Oral daily  metoprolol tartrate 50 milliGRAM(s) Oral two times a day  pantoprazole    Tablet 40 milliGRAM(s) Oral before breakfast  predniSONE   Tablet 60 milliGRAM(s) Oral daily  senna 2 Tablet(s) Oral at bedtime  valACYclovir 1000 milliGRAM(s) Oral three times a day    MEDICATIONS  (PRN):  acetaminophen   Tablet. 650 milliGRAM(s) Oral every 6 hours PRN Mild and moderate pain  aluminum hydroxide/magnesium hydroxide/simethicone Suspension 30 milliLiter(s) Oral every 4 hours PRN Dyspepsia  oxyCODONE  ER Tablet 20 milliGRAM(s) Oral every 12 hours PRN Severe pain        CAPILLARY BLOOD GLUCOSE      POCT Blood Glucose.: 108 mg/dL (13 May 2018 17:19)  POCT Blood Glucose.: 125 mg/dL (13 May 2018 08:00)    I&O's Summary    12 May 2018 07:01  -  13 May 2018 07:00  --------------------------------------------------------  IN: 1010 mL / OUT: 0 mL / NET: 1010 mL    13 May 2018 07:01  -  13 May 2018 19:32  --------------------------------------------------------  IN: 550 mL / OUT: 0 mL / NET: 550 mL        PHYSICAL EXAM:  GENERAL: NAD, well-developed  HEAD:  Atraumatic, Normocephalic  NECK: Supple, No JVD  CHEST/LUNG: Clear to auscultation bilaterally; No wheezing.  HEART: Regular rate and rhythm; No murmurs, rubs, or gallops  ABDOMEN: Soft, Nontender, Nondistended; Bowel sounds present  EXTREMITIES:   No clubbing, cyanosis, or edema  NEUROLOGY: AAO X 3  SKIN: No rashes    LABS:                        11.5   9.34  )-----------( 375      ( 13 May 2018 05:26 )             37.0     05-13    138  |  98  |  14  ----------------------------<  111<H>  4.0   |  26  |  0.67    Ca    8.5      13 May 2018 05:26  Mg     2.4     05-13              CAPILLARY BLOOD GLUCOSE      POCT Blood Glucose.: 108 mg/dL (13 May 2018 17:19)  POCT Blood Glucose.: 125 mg/dL (13 May 2018 08:00)    05-10 @ 22:04  Culture-urine   NO GROWTH AT 24 HOURS  Culture results --  method type --  Organism --  Organism Identification --  Specimen source URINE MIDSTREAM           05-10 @ 22:04  Culture blood --  Culture results --  Gram stain --  Gram stain blood --  Method type --  Organism --  Organism identification --  Specimen source URINE MIDSTREAM      RADIOLOGY & ADDITIONAL TESTS:    Imaging Personally Reviewed:    Consultant(s) Notes Reviewed:      Care Discussed with Consultants/Other Providers:

## 2018-05-14 ENCOUNTER — TRANSCRIPTION ENCOUNTER (OUTPATIENT)
Age: 53
End: 2018-05-14

## 2018-05-14 VITALS
OXYGEN SATURATION: 100 % | HEART RATE: 67 BPM | DIASTOLIC BLOOD PRESSURE: 88 MMHG | TEMPERATURE: 98 F | SYSTOLIC BLOOD PRESSURE: 148 MMHG | RESPIRATION RATE: 18 BRPM

## 2018-05-14 LAB
GLUCOSE BLDC GLUCOMTR-MCNC: 131 MG/DL — HIGH (ref 70–99)
GLUCOSE BLDC GLUCOMTR-MCNC: 168 MG/DL — HIGH (ref 70–99)

## 2018-05-14 PROCEDURE — 93306 TTE W/DOPPLER COMPLETE: CPT | Mod: 26

## 2018-05-14 RX ORDER — PANTOPRAZOLE SODIUM 20 MG/1
1 TABLET, DELAYED RELEASE ORAL
Qty: 10 | Refills: 0 | OUTPATIENT
Start: 2018-05-14 | End: 2018-05-23

## 2018-05-14 RX ORDER — NITROGLYCERIN 6.5 MG
1 CAPSULE, EXTENDED RELEASE ORAL
Qty: 0 | Refills: 0 | COMMUNITY

## 2018-05-14 RX ORDER — HYDROXYZINE HCL 10 MG
2 TABLET ORAL
Qty: 0 | Refills: 0 | COMMUNITY

## 2018-05-14 RX ORDER — SENNA PLUS 8.6 MG/1
2 TABLET ORAL
Qty: 60 | Refills: 0 | OUTPATIENT
Start: 2018-05-14 | End: 2018-06-12

## 2018-05-14 RX ORDER — DOCUSATE SODIUM 100 MG
1 CAPSULE ORAL
Qty: 60 | Refills: 0 | OUTPATIENT
Start: 2018-05-14 | End: 2018-06-12

## 2018-05-14 RX ORDER — VALACYCLOVIR 500 MG/1
1 TABLET, FILM COATED ORAL
Qty: 12 | Refills: 0 | OUTPATIENT
Start: 2018-05-14 | End: 2018-05-17

## 2018-05-14 RX ADMIN — Medication 0.5 MILLIGRAM(S): at 06:01

## 2018-05-14 RX ADMIN — PANTOPRAZOLE SODIUM 40 MILLIGRAM(S): 20 TABLET, DELAYED RELEASE ORAL at 06:02

## 2018-05-14 RX ADMIN — VALACYCLOVIR 1000 MILLIGRAM(S): 500 TABLET, FILM COATED ORAL at 06:02

## 2018-05-14 RX ADMIN — Medication 60 MILLIGRAM(S): at 06:03

## 2018-05-14 RX ADMIN — Medication 100 MILLIGRAM(S): at 06:03

## 2018-05-14 RX ADMIN — Medication 50 MICROGRAM(S): at 06:03

## 2018-05-14 RX ADMIN — ENOXAPARIN SODIUM 40 MILLIGRAM(S): 100 INJECTION SUBCUTANEOUS at 13:49

## 2018-05-14 RX ADMIN — Medication 1 DROP(S): at 13:49

## 2018-05-14 RX ADMIN — Medication 30 MILLILITER(S): at 06:05

## 2018-05-14 RX ADMIN — Medication 0.5 MILLIGRAM(S): at 13:48

## 2018-05-14 RX ADMIN — CLOPIDOGREL BISULFATE 75 MILLIGRAM(S): 75 TABLET, FILM COATED ORAL at 13:48

## 2018-05-14 RX ADMIN — Medication 1 DROP(S): at 06:03

## 2018-05-14 RX ADMIN — Medication 50 MILLIGRAM(S): at 06:02

## 2018-05-14 RX ADMIN — Medication 81 MILLIGRAM(S): at 13:48

## 2018-05-14 RX ADMIN — VALACYCLOVIR 1000 MILLIGRAM(S): 500 TABLET, FILM COATED ORAL at 13:48

## 2018-05-14 NOTE — DISCHARGE NOTE ADULT - MEDICATION SUMMARY - MEDICATIONS TO TAKE
I will START or STAY ON the medications listed below when I get home from the hospital:    predniSONE 20 mg oral tablet  -- 3 tab(s) by mouth once a day for 4 days. 2 tabs for 1 day after that. 1 tab for one day after that   -- Indication: For Bell's palsy    Tylenol 8 Hour 650 mg oral tablet, extended release  -- 2 tab(s) by mouth every 8 hours  -- Indication: For pain    aspirin 81 mg oral tablet  -- 1 tab(s) by mouth once a day  -- Indication: For CAD (coronary artery disease)    oxyCODONE 20 mg oral tablet, extended release  -- 1 tab(s) by mouth every 12 hours  -- Indication: For severe pain    clonazePAM 0.5 mg oral tablet  -- 1 tab(s) by mouth 3 times a day  -- Indication: For Anxiety    rosuvastatin 10 mg oral tablet  -- 1 tab(s) by mouth once a day (at bedtime)  -- Indication: For HLD (hyperlipidemia)    Plavix 75 mg oral tablet  -- 1 tab(s) by mouth once a day  -- Indication: For CAD (coronary artery disease)    valACYclovir 1 g oral tablet  -- 1 tab(s) by mouth 3 times a day  -- Indication: For Bell's palsy    metoprolol tartrate 50 mg oral tablet  -- 1 tab(s) by mouth 2 times a day  -- Indication: For CAD (coronary artery disease)    senna oral tablet  -- 2 tab(s) by mouth once a day (at bedtime)  -- Indication: For Constipation    docusate sodium 100 mg oral capsule  -- 1 cap(s) by mouth 2 times a day  -- Indication: For Constipation    ocular lubricant ophthalmic solution  -- 1 drop(s) to each affected eye 4 times a day  -- Indication: For prevent dry eyes    pantoprazole 40 mg oral delayed release tablet  -- 1 tab(s) by mouth once a day (before a meal)  -- Indication: For Acid reflux    levothyroxine 50 mcg (0.05 mg) oral tablet  -- 1 tab(s) by mouth once a day  -- Indication: For Hypothyroidism

## 2018-05-14 NOTE — PROGRESS NOTE ADULT - ASSESSMENT
52 year old female with  presumed CAD (started on medical management in [facility]), HTN, HLD, hypothyroidism, chronic back pain and anxiety admitted with chest pain/ back pain/ right facial droop    1. Chest pain   unclear etiology  no evidence of ACS or decomp CHF on exam   EKG revealing TWI on septal leads which are unchanged from prior ekg performed in [facility]   echo performed in [facility] revealing normal LV function,, LVH, grade I diastolic dysfx, mild pericardial effusion  echo and stress are both normal   continue with asa/ plavix/ statin    chest xray revealing clear lungs / cardiomegaly   CTA negative for aoritc dissection     2. Right facial droop ? bells Palsy  Neuro f/u   ct head negative   plan for antivirals and steroids per neuro   MRI neg to CVA     3. htn   on BB   bp stable     dvt ppx

## 2018-05-14 NOTE — DISCHARGE NOTE ADULT - HOSPITAL COURSE
51 y/o English female, who arrived from [facility] two days ago, with a PMHx of presumed CAD (started on medical management in [facility]), HTN, HLD, hypothyroidism, chronic back pain and anxiety presents to ED with chest pain, back pain and right facial droop. Pt reports that she has been having intermittent, non-pleuritic, non-exertional, left sided chest pain radiating to the left arm for the past month.    Patient with right peripheral facial palsy in setting of recent nonspecific viral infection. MRI brain r shows only some nonspecific white matter changes but no acute ischemia. Pt was started on Prednisone 60 mg and Valtrex for suspected Bell's Palsy. 51 y/o Yi female, who arrived from [facility] two days ago, with a PMHx of presumed CAD (started on medical management in [facility]), HTN, HLD, hypothyroidism, chronic back pain and anxiety presents to ED with chest pain, back pain and right facial droop. Pt reports that she has been having intermittent, non-pleuritic, non-exertional, left sided chest pain radiating to the left arm for the past month.    Patient with right peripheral facial palsy in setting of recent nonspecific viral infection. MRI brain r shows only some nonspecific white matter changes but no acute ischemia. Pt was started on Prednisone 60 mg and Valtrex for suspected Bell's Palsy. For chest pain pt had an echocardiogram and stress test which revealed normal studies. 53 y/o Armenian female, who arrived from [facility] two days ago, with a PMHx of presumed CAD (started on medical management in [facility]), HTN, HLD, hypothyroidism, chronic back pain and anxiety presents to ED with chest pain, back pain and right facial droop. Pt reports that she has been having intermittent, non-pleuritic, non-exertional, left sided chest pain radiating to the left arm for the past month.    Patient with right peripheral facial palsy in setting of recent nonspecific viral infection. MRI brain r shows only some nonspecific white matter changes but no acute ischemia. Pt was started on Prednisone 60 mg and Valtrex for suspected Bell's Palsy. For chest pain pt had an echocardiogram and stress test which revealed normal studies. Pt is now cleared for discharge home with outpt neurology follow up.

## 2018-05-14 NOTE — DISCHARGE NOTE ADULT - CARE PLAN
Principal Discharge DX:	Bell's palsy  Goal:	resolution of symptoms  Assessment and plan of treatment:	You likely have Bell's Palsy and you are being treated with a short course of steroids and Valtrex.Wear eye patch and use  eye lubrication drops to prevent corneal irritation and abrasion  After you finish the medications you should schedule an appointment with a neurologist.  Secondary Diagnosis:	Chest pain  Assessment and plan of treatment:	Your stress test and echocardiogram revealed normal studies. Principal Discharge DX:	Bell's palsy  Goal:	resolution of symptoms  Assessment and plan of treatment:	You likely have Bell's Palsy and you are being treated with a short course of steroids and Valtrex.Wear eye patch and use  eye lubrication drops to prevent corneal irritation and abrasion  After you finish the medications you should schedule an appointment with neurologist Dr. Subramanian.  Secondary Diagnosis:	Chest pain  Assessment and plan of treatment:	Your stress test and echocardiogram revealed normal studies.

## 2018-05-14 NOTE — PROGRESS NOTE ADULT - SUBJECTIVE AND OBJECTIVE BOX
CC: no new complaints    TELEMETRY:     PHYSICAL EXAM:    T(C): 36.4 (05-14-18 @ 05:57), Max: 36.4 (05-14-18 @ 05:57)  HR: 65 (05-14-18 @ 05:57) (61 - 65)  BP: 150/90 (05-14-18 @ 05:57) (149/85 - 150/90)  RR: 16 (05-14-18 @ 05:57) (16 - 17)  SpO2: 98% (05-14-18 @ 05:57) (98% - 98%)  Wt(kg): --  I&O's Summary    13 May 2018 07:01  -  14 May 2018 07:00  --------------------------------------------------------  IN: 910 mL / OUT: 0 mL / NET: 910 mL        Appearance: Normal	  Cardiovascular: Normal S1 S2,RRR, No JVD, No murmurs  Respiratory: Lungs clear to auscultation	  Gastrointestinal:  Soft, Non-tender, + BS	  Extremities: Normal range of motion, No clubbing, cyanosis or edema  Vascular: Peripheral pulses palpable 2+ bilaterally     LABS:	 	                          11.5   9.34  )-----------( 375      ( 13 May 2018 05:26 )             37.0     05-13    138  |  98  |  14  ----------------------------<  111<H>  4.0   |  26  |  0.67    Ca    8.5      13 May 2018 05:26  Mg     2.4     05-13            CARDIAC MARKERS:

## 2018-05-14 NOTE — DISCHARGE NOTE ADULT - CARE PROVIDER_API CALL
Feliciano Subramanian (DO), Neurology  [address] [address]  Phone: [phone]  Fax: [phone]

## 2018-05-14 NOTE — DISCHARGE NOTE ADULT - PATIENT PORTAL LINK FT
You can access the [facility] Patient Portal, offered by [facility], by registering with the following website: [email or web address]

## 2018-05-14 NOTE — DISCHARGE NOTE ADULT - PLAN OF CARE
resolution of symptoms You likely have Bell's Palsy and you are being treated with a short course of steroids and Valtrex.Wear eye patch and use  eye lubrication drops to prevent corneal irritation and abrasion  After you finish the medications you should schedule an appointment with a neurologist. Your stress test and echocardiogram revealed normal studies. You likely have Bell's Palsy and you are being treated with a short course of steroids and Valtrex.Wear eye patch and use  eye lubrication drops to prevent corneal irritation and abrasion  After you finish the medications you should schedule an appointment with neurologist Dr. Subramanian.

## 2018-05-14 NOTE — DISCHARGE NOTE ADULT - MEDICATION SUMMARY - MEDICATIONS TO STOP TAKING
I will STOP taking the medications listed below when I get home from the hospital:    cefixime 200 mg oral tablet  -- 1 tab(s) by mouth 2 times a day    nitroglycerin 0.4 mg sublingual tablet  -- 1 tab(s) under tongue every 5 minutes

## 2018-05-16 PROBLEM — M54.9 DORSALGIA, UNSPECIFIED: Chronic | Status: ACTIVE | Noted: 2018-05-11

## 2018-05-16 PROBLEM — E78.5 HYPERLIPIDEMIA, UNSPECIFIED: Chronic | Status: ACTIVE | Noted: 2018-05-11

## 2018-05-16 PROBLEM — F41.9 ANXIETY DISORDER, UNSPECIFIED: Chronic | Status: ACTIVE | Noted: 2018-05-11

## 2018-05-16 PROBLEM — E03.9 HYPOTHYROIDISM, UNSPECIFIED: Chronic | Status: ACTIVE | Noted: 2018-05-11

## 2018-05-16 PROBLEM — I10 ESSENTIAL (PRIMARY) HYPERTENSION: Chronic | Status: ACTIVE | Noted: 2018-05-10

## 2018-05-16 PROBLEM — I25.10 ATHEROSCLEROTIC HEART DISEASE OF NATIVE CORONARY ARTERY WITHOUT ANGINA PECTORIS: Chronic | Status: ACTIVE | Noted: 2018-05-11

## 2018-05-23 PROBLEM — Z00.00 ENCOUNTER FOR PREVENTIVE HEALTH EXAMINATION: Status: ACTIVE | Noted: 2018-05-23

## 2018-06-11 ENCOUNTER — APPOINTMENT (OUTPATIENT)
Dept: NEUROLOGY | Facility: CLINIC | Age: 53
End: 2018-06-11

## 2018-09-17 NOTE — H&P ADULT - NEGATIVE OPHTHALMOLOGIC SYMPTOMS
alert
no pain R/no loss of vision L/no diplopia/no photophobia/no pain L/no blurred vision R/no loss of vision R/no blurred vision L

## 2022-08-12 ENCOUNTER — APPOINTMENT (OUTPATIENT)
Dept: NEUROLOGY | Facility: CLINIC | Age: 57
End: 2022-08-12

## 2023-12-15 NOTE — H&P ADULT - NS ABD PE RECTAL EXAM
Discussed with patient personally. Work labs from July 2023 reviewed under media tab. Let him know reason for 90 days with no refills previously was simply that he had not had certain lab tests completed. BMP and PSA ordered. Patient voiced understanding. Patient to get labs done in the next week or so. Refills for 90 days with 3 refills given.   ES not examined

## 2024-09-11 ENCOUNTER — APPOINTMENT (OUTPATIENT)
Dept: GASTROENTEROLOGY | Facility: CLINIC | Age: 59
End: 2024-09-11

## 2025-07-07 NOTE — ED ADULT NURSE NOTE - CAS TRG GEN SKIN COLOR
I IBETH in regards to confirming appt for 07/15/2025               @   01:15 PM W/ Nestor.                
Normal for race